# Patient Record
(demographics unavailable — no encounter records)

---

## 2017-01-01 NOTE — PN
Neonatology, Progress Note





-  Exam


Last weight documented: 2.185 kg


Chest Circumference: 29.0


Head Circumference: 29.0


Vital Signs: 


 Vital Signs











Temperature  98.6 F   17 05:30


 


Pulse Rate  160   17 05:30


 


Respiratory Rate  47   17 05:30


 


Blood Pressure  64/32   09/15/17 21:00


 


O2 Sat by Pulse Oximetry (%)  100   09/15/17 21:00











General Appearance: Yes: No Abnormalities, Full ROM, Spontaneous movements


Skin: Yes: No Abnormalities, Jaundice


Head: Yes: No Abnormalities


Eyes: Yes: No Abnormalities, Clear


Ears: Yes: No Abnormalities, Symmetrical


Nose: Yes: No Abnormalities


Mouth: Yes: No Abnormalities


Chest: Yes: No Abnormalities, Symmetrical


Lungs/Respiratory: Yes: Clear, Bilateral good air entry


Cardiac: Yes: No Abnormalities, Murmur (1-2/6 systolic murmur  LSB), Other (S1 

and S2 normal)


Abdomen: Yes: No Abnormalities


Gastrointestinal: Yes: No Abnormalities


Genitalia: No Abnormalities


Genitalia, Male: Yes: Bilateral testes descended, Penis appears normal


Anus: Yes: No Abnormalities, Patent


Extremities: Yes: No Abnormalities, 10 Fingers, 10 Toes


Spine: Yes: No Abnormalities


Neuro: Yes: No Abnormalities, Alert, Active


Cry: No Abnormalities, Strong


Intake and Output: 


 Intake + Output











 09/15/17 09/16/17





 23:59 11:59


 


Intake Total 130 95


 


Output Total 83 51


 


Balance 47 44


 


Intake:  


 


  Oral 25 95


 


  Expressed Breastmilk 40 


 


  Tube Feeding 65 


 


Output:  


 


  Urine 83 51


 


Other:  


 


  Weight 2.185 kg 


 


  Weight Measurement Method Baby Scale 











Labs, Other Data: 


 Baby's Blood Type, Annamaria











Cord Blood Type  A POSITIVE   09/10/17  23:40    


 


RUDOLPH, Poly Interpret  Negative  (NEGATIVE)   09/10/17  23:40    














Assessment/Plan


6 day old  34wk by US and exam (37wk by dates), male infant born via 

primary  for twin gestation and variable decels in twin B. Admitted to 

NICU for prematurity and r/o sepsis ( labor). Mother: s/p betamethasone 

at 28wks - 1 course. Baby was on RA, no respiratory issues, under phototherapy 

started around 12h of life for elevated bili with elevated retics. 





Resp: Continue on RA. Infant remains stable; no ABDs reported; will continue to 

monitor. 


ID: s/p Amp+GentX 48 h-blood culture negative; will continue monitoring. 


Cardio: 1-2/6 systolic murmur LUSB most likely PDA , continue follow.


Hem: Anemia- stable at HCt 29.4- will repeat in 2-3 days( to minimize blood loss

);  bili this morning 9.1- will d/c phototherapy and repeat  Bili in am - G6PD 

sent 17- f/u results.Follow rebound bili   


Metab/ Alim : Feeds at 35 ml Q3h EBM/ Enf 22; off IVF since ; tolerating 

enteral feeds BMP 9/15acceptable. Some nippling issues.

## 2017-01-01 NOTE — PN
Progress Note (short form)





- Note


Progress Note: 


12 hrs old  34wk by US and exam (37wk by dates), male infant born via 

primary  for twin gestation and variable decels in twin B. Admitted to 

NICu for prematurity and r/o sepsis ( labor)





Mother: s/p betamethasone at 28wks - 1 course


Infant remains stable on RA No ABDs reported


Started feeds - tolerating 10ml 


                     Feeds are being advanced and IV being weaned


Infant remains on antibiotics


BMP/Bili from today being sent


Will Rpt CBC/BMP/Bili in AM





Labs


 CBC, BMP





 17 01:10

## 2017-01-01 NOTE — DS
- Maternal History


Mother's Age: 21


 Status: 


Mother's Blood Type: A(+)


HBSAG: Negative


Date: 17


RPR: Negative


Date: 17


Group B Strep: Unknown


GBS Treated in Labor: Yes


HIV: Negative





- Maternal Risks


OB Risks:  Labor, twin gestation





[facility] Data





- Admission


Date of Admission: 17


Admission Time: 00:00


Date of Delivery: 09/10/17


Time of Delivery: 23:40


Wks Gestation by Dates: 37.0


Wks Gestation by Sono: 34.2


Infant Gender: Male


Type of Delivery: Primary C/S


Reason for C Section: Twin gestation , transverse lie


Apgar Score @1 Minute: 9


Apgar score @ 5 Minutes: 9


Birth Weight: 2.25 kg


Birth Length: 43 cm


Head Circumference, Admission: 29.0


Chest Circumference: 29.0


Abdominal Girth: 26.5





- Hearing Screen


Left Ear: Passed


Right Ear: Passed


Hearing Screen Complete: 17





- Labs


Labs: 


 Baby's Blood Type, Annamaria











Cord Blood Type  A POSITIVE   09/10/17  23:40    


 


RUDOLPH, Poly Interpret  Negative  (NEGATIVE)   09/10/17  23:40    














- [facility] Screening


[facility] Screening Card Number: [id number]





Neonatology, Discharge





- History of Present Illness


 History: 


13 day old  34wk by US and exam (37wk by dates), male infant born via 

primary  for twin gestation and variable decels in twin B. Admitted to 

NICU for prematurity and r/o sepsis ( labor). Mother: s/p betamethasone 

at 28wks - 1 course. Baby was on RA, no respiratory issues; with anemia and 

hyperbilirubinemia- treated with phototherapy; off photo for the last 2 days. 

Tolerating feeds po, gaining weight; voiding and stooling





Resp: Stable on room air; no ABDs reported; will continue to monitor. 


ID: s/p Amp+GentX 48 h-blood culture negative; will continue monitoring. 


Cardio: Systolic murmur-diminished compared to my previous exams; 

hemodinamically stable; continue monitoring


Hem: Anemia, worsening (H&H: 6.5/18.5), repeated H&H 6.6/19.5, Retics 8.2; bili 

this morning 12.3/0.5


Spoke with Hematology at [facility] about the worsening anemia; recommendation made to 

transfuse baby after anemia work-up labs sent. Considering the severity of 

anemia, the need for transfusion and further work-up, decision made to transfer 

baby to [facility] for further management.  


Metab/ Alim: Feeds at 35-40 ml Q3h EBM/ Enf 22; tolerating enteral feeds. 


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





-  Infant


Last Weight Documented: 2.235 kg


Head Circumference (cms): 29.0


General Appearance: Yes: No Abnormalities, Full ROM, Pale


Skin: Yes: No Abnormalities, Jaundice


Head: Yes: No Abnormalities


Eyes: Yes: No Abnormalities


Ears: Yes: No Abnormalities


Nose: Yes: No Abnormalities


Mouth: Yes: No Abnormalities


Chest: Yes: No Abnormalities, Symmetrical


Lungs/Respiratory: Yes: No Abnormalities, Clear, Bilateral good air entry


Cardiac: Yes: Murmur (systolic ejection 2/6 LLSB), Peripheral pulses strong, 

Capillary refill immediat


Abdomen: Yes: No Abnormalities


Gastrointestinal: Yes: No Abnormalities


Genitalia: No Abnormalities


Genitalia, Male: Yes: Bilateral testes descended, Penis appears normal


Anus: Yes: No Abnormalities


Extremities: Yes: No Abnormalities, 10 Fingers, 10 Toes


Spine: Yes: No Abnormalities


Reflexes: Lynn: Present, Rooting: Present, Sucking: Present


Neuro: Yes: No Abnormalities


Cry: Yes: No Abnormalities





Discharge Summary


Reason For Visit:  TWIN A


Current Active Problems





Hyperbilirubinemia (Acute) 


Prematurity of fetus (Acute) 


Twin birth, in hospital, delivered by  section (Acute) 


Severe anemia


Horseshoe Kidney


Heart murmur


Condition: Stable





- Instructions


Disposition: TRANSFER ACUTE CARE/OTHER HOSP

## 2017-01-01 NOTE — PN
Neonatology, Progress Note





-  Exam


Last weight documented: 2.155 kg


Chest Circumference: 29.0


Head Circumference: 29.0


Vital Signs: 


 Vital Signs











Temperature  37.1 C   17 11:00


 


Pulse Rate  147   17 11:00


 


Respiratory Rate  62   17 11:00


 


Blood Pressure  67/43   17 08:00


 


O2 Sat by Pulse Oximetry (%)  99   17 09:00











General Appearance: Yes: Full ROM, Spontaneous movements, Pink, Other (slightly 

edema on hands and feet)


Skin: Yes: No Abnormalities


Head: Yes: No Abnormalities


Eyes: Yes: No Abnormalities, Clear


Ears: Yes: No Abnormalities, Symmetrical


Nose: Yes: No Abnormalities, Nares patent


Mouth: Yes: No Abnormalities


Chest: Yes: No Abnormalities, Symmetrical


Cardiac: Yes: No Abnormalities, Murmur (systolic ejection 2/6, LLSB,), Other ((+

S1, S2))


Abdomen: Yes: No Abnormalities, Umb Ves, 2 artery 1 vein


Gastrointestinal: Yes: No Abnormalities


Genitalia: No Abnormalities


Genitalia, Male: Yes: Bilateral testes descended, Penis appears normal


Anus: Yes: No Abnormalities, Patent


Extremities: Yes: No Abnormalities, 10 Fingers, 10 Toes


Spine: Yes: No Abnormalities


Neuro: Yes: No Abnormalities, Alert, Active


Cry: No Abnormalities, Strong


Current Medications: 


Active Medications





Calcium Gluconate 625 mg/ (Dextrose)  500 mls @ 7.5 mls/hr IVPB Q24H ANAHY


   PRN Reason: Protocol








Intake and Output: 


 Intake + Output











 17





 11:59 23:59


 


Intake Total 160.0 


 


Output Total 118 


 


Balance 42.0 


 


Intake:  


 


  IV 90.0 


 


    D7.5 W 90.0 


 


  Oral 10 


 


  Tube Feeding 60 


 


Output:  


 


  Urine 118 


 


Other:  


 


  Bowel Movement Yes 


 


  Weight 2.155 kg 


 


  Weight Measurement Method Baby Scale 











Labs, Other Data: 


 Baby's Blood Type, Annamaria











Cord Blood Type  A POSITIVE   09/10/17  23:40    


 


RUDOLPH, Poly Interpret  Negative  (NEGATIVE)   09/10/17  23:40    








 Laboratory Tests











  17





  07:35 07:35


 


WBC  12.5 


 


Corrected WBC (auto)  11.26 


 


RBC  3.57 L 


 


Hgb  9.9 L 


 


Hct  29.4 L* 


 


MCV  82.3 L 


 


MCH  27.8 L 


 


MCHC  33.8 


 


RDW  52.8 H 


 


Plt Count  454 H 


 


MPV  8.2 


 


Total Counted  100 


 


Neutrophils % (Manual)  51 


 


Lymphocytes % (Manual)  33 


 


Monocytes % (Manual)  15 H 


 


Basophils % (Manual)  1 


 


Nucleated RBC %  11 H 


 


Sodium   146 H


 


Potassium   4.9


 


Chloride   112 H


 


Carbon Dioxide   23


 


Anion Gap   11


 


BUN   4 L D


 


Creatinine   0.6 L D


 


Calcium   8.5


 


Total Bilirubin   9.2


 


Direct Bilirubin   0.4 H D














Problem List





- Problems


(1) Prematurity of fetus


Code(s): P07.30 -  , UNSPECIFIED WEEKS OF GESTATION





(2) Twin birth, in hospital, delivered by  section


Code(s): Z38.31 - TWIN LIVEBORN INFANT, DELIVERED BY 








Assessment/Plan


3 day old  34wk by US and exam (37wk by dates), male infant born via 

primary  for twin gestation and variable decels in twin B. Admitted to 

NICU for prematurity and r/o sepsis ( labor). Mother: s/p betamethasone 

at 28wks - 1 course. Baby was on RA, no respiratory issues, under phototherapy 

started around 12h of life for elevated bili with elevated retics. 





Resp: Continue on RA. Infant remains stable; no ABDs reported; will continue to 

monitor. 


ID: Discontinue Amp+Gent; 48 h blood culture negative; WBC acceptable; will 

continue monitoring. 


Cardio: Systolic murmur, most likely closing PDA ; hemodinamically stable; 

continue monitoring- if persists, consider Echo


Hem: Anemia with elevated retics and hyperbili; will continue phototherapy and 

monitoring CBCd, Bili and Retics- stable so far; G6PD sent 17.  


Metab/ Alim :  IVF at 80 ml/kg/day; tolerating 20 ml Q3h OGT. bili stable, plan 

to wean IVF as feeds are being advanced. Plan for bili in am


Labs: no CBC in am as it has been stable and with mlow HCT want to minimize 

blood draws, no BMP as it has been stable and infant is tolerating advancign 

feeds and weaning IVF. Consider CBC/retic/BMP in a few days. 





Discussed plan with nurses; family updated.

## 2017-01-01 NOTE — PN
Neonatology, Progress Note





- History of Present Illness


 History: 


9 day old male with hemolytic anemia (w[place] negative), hyperbilirubinemia, 

learning to nipple (feeding issues), prematurity, twin A. TOlerating feeds 

well. Voiding and stooling. 





-  Exam


Last weight documented: 2.14 kg


Chest Circumference: 29.0


Head Circumference: 29.0


Vital Signs: 


 Vital Signs











Temperature  37.0 C   17 05:30


 


Pulse Rate  169 H  17 05:30


 


Respiratory Rate  59   17 05:30


 


Blood Pressure  64/35   17 21:00


 


O2 Sat by Pulse Oximetry (%)  99   17 21:00











General Appearance: Yes: No Abnormalities, Full ROM, Spontaneous movements


Skin: Yes: No Abnormalities, Jaundice


Head: Yes: No Abnormalities


Eyes: Yes: No Abnormalities, Clear


Ears: Yes: No Abnormalities, Symmetrical


Nose: Yes: No Abnormalities


Mouth: Yes: No Abnormalities


Chest: Yes: No Abnormalities, Symmetrical


Lungs/Respiratory: Yes: No Abnormalities, Clear, Bilateral good air entry


Cardiac: Yes: No Abnormalities, Murmur (1-2/6 systolic murmur  LSB), Other (S1 

and S2 normal)


Abdomen: Yes: No Abnormalities


Gastrointestinal: Yes: No Abnormalities


Genitalia: No Abnormalities


Genitalia, Male: Yes: Bilateral testes descended, Penis appears normal, 

Hydrocele, Other


Anus: Yes: No Abnormalities, Patent


Extremities: Yes: No Abnormalities, 10 Fingers, 10 Toes


Spine: Yes: No Abnormalities


Reflexes: Lopez: Present, Sucking: Present


Neuro: Yes: No Abnormalities, Alert, Active


Cry: No Abnormalities, Strong


Intake and Output: 


 Intake + Output











 17





 23:59 11:59


 


Intake Total 141 120


 


Output Total 69 60


 


Balance 72 60


 


Intake:  


 


  Oral 141 75


 


  Expressed Breastmilk  45


 


Output:  


 


  Urine 69 60


 


Other:  


 


  Weight 2.14 kg 


 


  Weight Measurement Method Baby Scale 











Labs, Other Data: 


 Baby's Blood Type, [place]











Cord Blood Type  A POSITIVE   09/10/17  23:40    


 


RUDOLPH, Poly Interpret  Negative  (NEGATIVE)   09/10/17  23:40    








 Laboratory Tests











  17





  08:00 08:00 08:00


 


WBC  6.3 L D  


 


RBC  2.98 L  


 


Hgb  7.6 L  


 


Hct  22.7 L* D  


 


MCV  76.2 L  


 


MCH  25.5 L  


 


MCHC  33.5  


 


RDW  47.2 H  


 


Plt Count  666 H D  


 


MPV  8.3  


 


Total Counted  100  


 


Neutrophils % (Manual)  22 L D  


 


Monocytes % (Manual)  13 H  


 


Eosinophils % (Manual)  4  D  


 


Basophils % (Manual)  2  


 


Anisocytosis  4+  


 


Microcytosis  2+  


 


Macrocytosis  2+  


 


Schistocytes  3+  


 


Retic Count    5.25 H D


 


Sodium   142 


 


Potassium   5.0 


 


Chloride   111 H 


 


Carbon Dioxide   26 


 


BUN   12  D 


 


Creatinine   0.5 L 


 


Calcium   9.4 


 


Total Bilirubin   10.6 


 


Direct Bilirubin   0.4 H 


 


AST   29  D 


 


ALT   13  D 


 


Alkaline Phosphatase   288 H 


 


Total Protein   5.0 L 


 


Albumin   2.9 L 














Problem List





- Problems


(1) Prematurity of fetus


Code(s): P07.30 -  , UNSPECIFIED WEEKS OF GESTATION





(2) Twin birth, in hospital, delivered by  section


Code(s): Z38.31 - TWIN LIVEBORN INFANT, DELIVERED BY 








Assessment/Plan


9 day old  34wk by US and exam (37wk by dates), male infant born via 

primary  for twin gestation and variable decels in twin B. Admitted to 

NICU for prematurity and r/o sepsis ( labor). Mother: s/p betamethasone 

at 28wks - 1 course. Baby was on RA, no respiratory issues, under phototherapy 

restarted  





Resp: Stable on room air; no ABDs reported; will continue to monitor. 


ID: s/p Amp+GentX 48 h-blood culture negative; will continue monitoring. 


Cardio: Systolic murmur; hemodinamically stable; continue monitoring- if 

persists, will consider Echo, will get EKG today


Hem: Anemia-last Hct 22- this am. Not symptomatic, retic decreased, will repeat 

in am;  total bili this morning 10.6- will continue phototherapy- one bank only 

and repeat  Bili in am . G6PD sent 17-test not performed due to clotted 

sample- resulted . Will hold off on re-sedning at this time given HCT. 

After repeat HCT if improvement will consider resending G6PD.  TST, Free T4 -

WNL. NBS (lab ID [id number]) not ready yet as per [facility]


Metab/ Alim: Feeds at 35 ml Q3h EBM/ Enf 22; tolerating enteral feeds, nippling 

Q other feed. Encourage po feeds. CMP acceptable  





Discussed plan with nurses; will update family.

## 2017-01-01 NOTE — PN
Neonatology, Progress Note





- History of Present Illness


 History: 


5 day old male, twin A, ex 34 weeker, with anemia and hyperbili, on 

phototherapy this morning. Voiding and stooling well. On feeds po/NGT; IVF 

stopped overnight; doing well; tolerating feeds, no acute events overnight. 





-  Exam


Last weight documented: 2.185 kg


Chest Circumference: 29.0


Head Circumference: 29.0


Vital Signs: 


 Vital Signs











Temperature  37.2 C   09/15/17 05:30


 


Pulse Rate  154   09/15/17 05:30


 


Respiratory Rate  38   09/15/17 05:30


 


Blood Pressure  72/50   17 20:00


 


O2 Sat by Pulse Oximetry (%)  99   17 20:00











General Appearance: Yes: Full ROM, Spontaneous movements, Pink, Other


Skin: Yes: No Abnormalities


Head: Yes: No Abnormalities


Eyes: Yes: No Abnormalities, Clear


Ears: Yes: No Abnormalities, Symmetrical


Nose: Yes: No Abnormalities, Nares patent


Mouth: Yes: No Abnormalities


Chest: Yes: No Abnormalities, Symmetrical


Cardiac: Yes: No Abnormalities, Murmur (systolic ejection 2/6, LLSB, softer 

than yesterday), Other ((+S1, S2))


Abdomen: Yes: No Abnormalities, Umb Ves, 2 artery 1 vein


Gastrointestinal: Yes: No Abnormalities


Genitalia: No Abnormalities


Genitalia, Male: Yes: Bilateral testes descended, Penis appears normal


Anus: Yes: No Abnormalities, Patent


Extremities: Yes: No Abnormalities, 10 Fingers, 10 Toes


Spine: Yes: No Abnormalities


Neuro: Yes: No Abnormalities, Alert, Active


Cry: No Abnormalities, Strong


Intake and Output: 


 Intake + Output











 09/14/17 09/15/17





 23:59 11:59


 


Intake Total 199.2 46


 


Output Total 102 72


 


Balance 97.2 -26


 


Intake:  


 


  IV 34.2 1


 


    D5W + CALCIUM 34.2 1


 


  Oral 62 10


 


  Tube Feeding 103 35


 


Output:  


 


  Urine 102 71


 


  Oral Regurgitation  1


 


Other:  


 


  Bowel Movement Yes 


 


  Weight 2.2 kg 2.185 kg


 


  Weight Measurement Method  Baby Scale











Labs, Other Data: 


 Baby's Blood Type, Annamaria











Cord Blood Type  A POSITIVE   09/10/17  23:40    


 


RUDOLPH, Poly Interpret  Negative  (NEGATIVE)   09/10/17  23:40    














Problem List





- Problems


(1) Hyperbilirubinemia


Code(s): E80.6 - OTHER DISORDERS OF BILIRUBIN METABOLISM





(2) Prematurity of fetus


Code(s): P07.30 -  , UNSPECIFIED WEEKS OF GESTATION





(3) Twin birth, in hospital, delivered by  section


Code(s): Z38.31 - TWIN LIVEBORN INFANT, DELIVERED BY 








Assessment/Plan


5 day old  34wk by US and exam (37wk by dates), male infant born via 

primary  for twin gestation and variable decels in twin B. Admitted to 

NICU for prematurity and r/o sepsis ( labor). Mother: s/p betamethasone 

at 28wks - 1 course. Baby was on RA, no respiratory issues, under phototherapy 

started around 12h of life for elevated bili with elevated retics. 





Resp: Continue on RA. Infant remains stable; no ABDs reported; will continue to 

monitor. 


ID: s/p Amp+GentX 48 h-blood culture negative; will continue monitoring. 


Cardio: Systolic murmur: decreased in intensity- closing PDA vs anemia; 

hemodinamically stable; continue monitoring- if persists, will consider Echo


Hem: Anemia- stable at HCt 29.4- will repeat in 2-3 days( to minimize blood loss

);  bili this morning 9.1- will d/c phototherapy and repeat  Bili in am - G6PD 

sent 17- f/u results.   


Metab/ Alim : Feeds at 35 ml Q3h EBM/ Enf 22; off IVF since overnight; 

tolerating enteral feeds, but getting tired with nippling; will nipple Q other 

feed; increase volume to 40 ml Q3h; BMP today -acceptable. 





Discussed plan with nurses; family updated.

## 2017-01-01 NOTE — PN
Neonatology, Progress Note





- History of Present Illness


 History: 


4 day old male, twin A, ex 34 weeker, with anemia and hyperbili, on 

phototherapy this morning. Voiding and stooling well. On feeds+IVF





-  Exam


Last weight documented: 2.2 kg


Chest Circumference: 29.0


Head Circumference: 29.0


Vital Signs: 


 Vital Signs











Temperature  37.3 C   17 08:30


 


Pulse Rate  142   17 08:30


 


Respiratory Rate  54   17 08:30


 


Blood Pressure  61/44   17 08:30


 


O2 Sat by Pulse Oximetry (%)  100   17 08:00











General Appearance: Yes: Full ROM, Spontaneous movements, Pink, Other (slightly 

edema on hands and feet)


Skin: Yes: No Abnormalities


Head: Yes: No Abnormalities


Eyes: Yes: No Abnormalities, Clear


Ears: Yes: No Abnormalities, Symmetrical


Nose: Yes: No Abnormalities, Nares patent


Mouth: Yes: No Abnormalities


Chest: Yes: No Abnormalities, Symmetrical


Cardiac: Yes: No Abnormalities, Murmur (systolic ejection 2/6, LLSB,), Other ((+

S1, S2))


Abdomen: Yes: No Abnormalities, Umb Ves, 2 artery 1 vein


Gastrointestinal: Yes: No Abnormalities


Genitalia: No Abnormalities


Genitalia, Male: Yes: Bilateral testes descended, Penis appears normal


Anus: Yes: No Abnormalities, Patent


Extremities: Yes: No Abnormalities, 10 Fingers, 10 Toes


Spine: Yes: No Abnormalities


Neuro: Yes: No Abnormalities, Alert, Active


Cry: No Abnormalities, Strong


Current Medications: 


Active Medications





Calcium Gluconate 625 mg/ (Dextrose)  500 mls @ 7.5 mls/hr IVPB Q24H ANAHY


   PRN Reason: Protocol


   Last Admin: 17 13:00 Dose:  7.5 mls/hr








Intake and Output: 


 Intake + Output











 17





 23:59 11:59


 


Intake Total 126.0 40.3


 


Output Total 115 98


 


Balance 11.0 -57.7


 


Intake:  


 


  IV 81.0 40.3


 


    D7.5 W 30.0 


 


    D5W 19.1 


 


    D5W + CALCIUM 31.9 40.3


 


  Tube Feeding 45 


 


Output:  


 


  Urine 115 98


 


Other:  


 


  Weight 2.155 kg 2.2 kg


 


  Weight Measurement Method  Baby Scale











Labs, Other Data: 


 Baby's Blood Type, Annamaria











Cord Blood Type  A POSITIVE   09/10/17  23:40    


 


RUDOLPH, Poly Interpret  Negative  (NEGATIVE)   09/10/17  23:40    














Problem List





- Problems


(1) Hyperbilirubinemia


Code(s): E80.6 - OTHER DISORDERS OF BILIRUBIN METABOLISM





(2) Prematurity of fetus


Code(s): P07.30 -  , UNSPECIFIED WEEKS OF GESTATION





(3) Twin birth, in hospital, delivered by  section


Code(s): Z38.31 - TWIN LIVEBORN INFANT, DELIVERED BY 








Assessment/Plan


4 day old  34wk by US and exam (37wk by dates), male infant born via 

primary  for twin gestation and variable decels in twin B. Admitted to 

NICU for prematurity and r/o sepsis ( labor). Mother: s/p betamethasone 

at 28wks - 1 course. Baby was on RA, no respiratory issues, under phototherapy 

started around 12h of life for elevated bili with elevated retics. 





Resp: Continue on RA. Infant remains stable; no ABDs reported; will continue to 

monitor. 


ID: s/p Amp+GentX 48 h-blood culture negative; WBC acceptable; will continue 

monitoring. 


Cardio: Systolic murmur: decreased in intensity- closing PDA vs anemia; 

hemodinamically stable; continue monitoring- if persists, will consider Echo


Hem: Anemia- stable at HCt 29.4- will repeat in 2-3 days( to minimise blood loss

);  bili this morning 10- will continue phototherapy  and repeat  Bili in am - 

G6PD sent 17- f/u results.   


Metab/ Alim : Feeds at 30 ml Q3h; this morning, had large residual and 

abdominal distension; one feed on hold; belly is soft, non-tender, good bowel 

sounds. will restart feeds at 2o ml Q3h and will gradually increase as tolerated

;  IVF at 40 ml/kg/day- plan to wean IVF as feeds if baby is tolerating feeds. 

No BMP today to minimize blood draws- will do BMP in am. 





Discussed plan with nurses; family updated.

## 2017-01-01 NOTE — PN
Neonatology, Progress Note





- History of Present Illness


 History: 


10 day old male twin A with anemia, hyperbilirubinemia, on phototherapy. 

Tolerating feeds. Voiding and stooling





-  Exam


Last weight documented: 2.17 kg


Chest Circumference: 29.0


Head Circumference: 29.0


Vital Signs: 


 Vital Signs











Temperature  37.1 C   17 12:00


 


Pulse Rate  167 H  17 12:00


 


Respiratory Rate  64   17 12:00


 


Blood Pressure  63/36   17 09:00


 


O2 Sat by Pulse Oximetry (%)  100   17 09:00











General Appearance: Yes: No Abnormalities, Full ROM, Spontaneous movements


Skin: Yes: No Abnormalities, Jaundice


Head: Yes: No Abnormalities


Eyes: Yes: No Abnormalities, Clear


Ears: Yes: No Abnormalities, Symmetrical


Nose: Yes: No Abnormalities


Mouth: Yes: No Abnormalities


Chest: Yes: No Abnormalities, Symmetrical


Lungs/Respiratory: Yes: No Abnormalities, Clear, Bilateral good air entry


Cardiac: Yes: No Abnormalities, Murmur (1-2/6 systolic murmur  LSB), Other (S1 

and S2 normal)


Abdomen: Yes: No Abnormalities


Gastrointestinal: Yes: No Abnormalities


Genitalia: No Abnormalities


Genitalia, Male: Yes: Bilateral testes descended, Penis appears normal, 

Hydrocele, Other


Anus: Yes: No Abnormalities, Patent


Extremities: Yes: No Abnormalities, 10 Fingers, 10 Toes


Spine: Yes: No Abnormalities


Reflexes: Lopez: Present, Sucking: Present


Neuro: Yes: No Abnormalities, Alert, Active


Cry: No Abnormalities, Strong


Intake and Output: 


 Intake + Output











 17





 11:59 23:59


 


Intake Total 160 40


 


Output Total 92 22


 


Balance 68 18


 


Intake:  


 


  Oral 115 40


 


  Expressed Breastmilk 45 


 


Output:  


 


  Urine 92 22


 


Other:  


 


  Weight 2.17 kg 


 


  Weight Measurement Method Baby Scale 











Labs, Other Data: 


 Baby's Blood Type, Annamaria











Cord Blood Type  A POSITIVE   09/10/17  23:40    


 


RUDOLPH, Poly Interpret  Negative  (NEGATIVE)   09/10/17  23:40    








 Laboratory Tests











  17





  07:35 07:35


 


WBC  7.2 L 


 


RBC  4.34  D 


 


Hgb  10.8 L D 


 


Hct  32.8 L* D 


 


MCV  75.7 L 


 


MCH  25.0 L 


 


MCHC  33.0 


 


RDW  45.6 H 


 


Plt Count  435 H D 


 


Neutrophils %  27.5 L D 


 


Lymphocytes %  49.9 H D 


 


Total Bilirubin   10.2


 


Direct Bilirubin   0.4 H














Problem List





- Problems


(1) Prematurity of fetus


Code(s): P07.30 -  , UNSPECIFIED WEEKS OF GESTATION





(2) Twin birth, in hospital, delivered by  section


Code(s): Z38.31 - TWIN LIVEBORN INFANT, DELIVERED BY 








Assessment/Plan


9 day old  34wk by US and exam (37wk by dates), male infant born via 

primary  for twin gestation and variable decels in twin B. Admitted to 

NICU for prematurity and r/o sepsis ( labor). Mother: s/p betamethasone 

at 28wks - 1 course. Baby was on RA, no respiratory issues, under phototherapy 

restarted  





Resp: Stable on room air; no ABDs reported; will continue to monitor. 


ID: s/p Amp+GentX 48 h-blood culture negative; will continue monitoring. 


Cardio: Systolic murmur; hemodinamically stable; continue monitoring- if 

persists, will consider Echo, EKG  normal for age


Hem: Anemia-last Hct 32- this am. Not symptomatic, retic decreased;  total bili 

this morning 10.2- will continue phototherapy- one bank only and repeat  Bili 

in am . G6PD sent 17-test not performed due to clotted sample- resulted . consider resending G6PD- need to coordiate withoutside lab for timing of 

collection and sample to be run.  TST, Free T4 -WNL. NBS (lab ID [id number]) not 

ready yet as per [facility]


Metab/ Alim: Feeds at 35 ml Q3h EBM/ Enf 22; tolerating enteral feeds, nippling 

Q other feed. Encourage po feeds. CMP acceptable  





Discussed plan with nurses; will update family.

## 2017-01-01 NOTE — HP
- Maternal History


Mother's Age: 21


 Status: 


Mother's Blood Type: A(+)


HBSAG: Negative


Date: 17


RPR: Negative


Date: 17


Group B Strep: Unknown


HIV: Negative


Other: PPD unknown, Rubella Immune,





Level 2, History and Physical


[place] History: 


 34wks by US and exam (37wks by dates) male infant twin A (di-di twin 

gestation). Pregnancy complicated by  labor at 28wks with one course of 

betamethasone given. Mother presented tonight in labor and dilated 3cm with 

variable decels in twin B. 





Born via  for twin gestation and variable decels in baby B. Infant 

born vigorous, cried immediately. Brought to warmer and routine Dr care given. 

Voided in DR. APGARs 9/9 at 1/5 minutes. Admitted to NICU for prematurity, r/o 

sepsis ( labor).





Initial glucose 56. 





Cord blood gas 7.3/51.4/22.7/24.6/-1.5





- [place] Infant


Birth Weight: 2.25 kg


Birth Length: 43.18 cm


General Appearance: Yes: Full ROM, Spontaneous movements, Pink


Skin: Yes: No Abnormalities, Other


Head: Yes: No Abnormalities


Eyes: Yes: No Abnormalities, Clear, Red reflex present


Ears: Yes: No Abnormalities, Symmetrical


Nose: Yes: No Abnormalities, Nares patent


Mouth: Yes: No Abnormalities


Chest: Yes: No Abnormalities, Symmetrical


Lungs/Respiratory: Yes: No Abnormalities, Clear, Bilateral good air entry


Cardiac: Yes: No Abnormalities, Other ((+0S1S2 (+) murmur likely closing PDA)


Abdomen: Yes: No Abnormalities, Umb Ves, 2 artery 1 vein


Gastrointestinal: Yes: No Abnormalities


Genitalia: No Abnormalities


Genitalia, Male: Yes: Bilateral testes descended, Penis appears normal


Anus: Yes: No Abnormalities, Patent


Extremities: Yes: No Abnormalities, 10 Fingers, 10 Toes


Spine: Yes: No Abnormalities


Neuro: Yes: No Abnormalities, Alert, Active


Cry: Yes: No Abnormalities, Strong





Problem List





- Problems


(1) Prematurity of fetus


Code(s): P07.30 -  , UNSPECIFIED WEEKS OF GESTATION





(2) Twin birth, in hospital, delivered by  section


Code(s): Z38.31 - TWIN LIVEBORN INFANT, DELIVERED BY 








Assessment/Plan


 34wk by US and exam (37wk by dates), male infant born via primary c-

section for twin gestation and variable decels in twin B. Admitted to NICu for 

prematurity and r/o sepsis ( labor)





Mother: s/p betamethasone at 28wks - 1 course





Plan:


admit to NICU


continuous cardiovascular monitoring


CBC and blood culture now


Amp/Gent


PIV with D10 and calcium at 100ml/kg/day


attempt to feed 5-10ml and advance as tolerated


repeat CBC, BMP and bili at 12hrs of life

## 2017-01-01 NOTE — PN
Neonatology, Progress Note





- History of Present Illness


 History: 


12 days old with - Bili improving, phototherapy discontinued . Tolerating 

PO feeds. Voiding and stooling. 








-  Exam


Last weight documented: 2.17 kg


Chest Circumference: 29.0


Head Circumference: 29.0


Vital Signs: 


 Vital Signs











Temperature  98.8 F   17 11:00


 


Pulse Rate  150   17 11:00


 


Respiratory Rate  34   17 11:00


 


Blood Pressure  65/29   17 09:00


 


O2 Sat by Pulse Oximetry (%)  100   17 09:00











General Appearance: Yes: No Abnormalities, Full ROM, Spontaneous movements


Skin: Yes: No Abnormalities, Jaundice


Head: Yes: No Abnormalities


Eyes: Yes: No Abnormalities, Clear


Ears: Yes: No Abnormalities, Symmetrical


Nose: Yes: No Abnormalities


Mouth: Yes: No Abnormalities


Chest: Yes: No Abnormalities, Symmetrical


Lungs/Respiratory: Yes: No Abnormalities, Clear, Bilateral good air entry


Cardiac: Yes: No Abnormalities, Murmur (1-2/6 systolic murmur  LSB), Other (S1 

and S2 normal)


Abdomen: Yes: No Abnormalities


Gastrointestinal: Yes: No Abnormalities


Genitalia: No Abnormalities


Genitalia, Male: Yes: Bilateral testes descended, Penis appears normal, 

Hydrocele, Other


Anus: Yes: No Abnormalities, Patent


Extremities: Yes: No Abnormalities, 10 Fingers, 10 Toes


Spine: Yes: No Abnormalities


Reflexes: Lopez: Present, Sucking: Present


Neuro: Yes: No Abnormalities, Alert, Active


Cry: No Abnormalities, Strong


Intake and Output: 


 Intake + Output











 17





 11:59 23:59


 


Intake Total 260 


 


Output Total 69 


 


Balance 191 


 


Intake:  


 


  Oral 260 


 


Output:  


 


  Urine 69 











Labs, Other Data: 


 Baby's Blood Type, Annamaria











Cord Blood Type  A POSITIVE   09/10/17  23:40    


 


RUDOLPH, Poly Interpret  Negative  (NEGATIVE)   09/10/17  23:40    














Assessment/Plan


10 day old  34wk by US and exam (37wk by dates), male infant born via 

primary  for twin gestation and variable decels in twin B. Admitted to 

NICU for prematurity and r/o sepsis ( labor). Mother: s/p betamethasone 

at 28wks - 1 course. Baby was on RA, no respiratory issues, under phototherapy 

restarted  





Resp: Stable on room air; no ABDs reported; will continue to monitor. 


ID: s/p Amp+GentX 48 h-blood culture negative; will continue monitoring. 


Cardio: Systolic murmur; hemodynamically stable; continue monitoring- if 

persists, will consider Echo, EKG  normal for age


Hem: Anemia-last Hct 32- this am. Not symptomatic, retic decreased;  total bili 

this morning 9- will discontinue phototherapy- and repeat  Bili in am . G6PD 

sent 17-test not performed due to clotted sample- resulted . consider 

resending G6PD- need to coordiate withoutside lab for timing of collection and 

sample to be run.  TSH, Free T4 -WNL. NBS (lab ID [id number]) not ready yet as 

per [facility]


Metab/ Alim: Feeds at 35 ml Q3h EBM/ Enf 22; tolerating enteral feeds, nippling 

Q other feed. Encourage po feeds


Rpt G6PD when retic count is low - at 4-6 weeks of age.





Bili:Total Bilirubin    9.0 mg/dL mg/dL    4.6 mg/dL L D mg/dL


     (6-12)     (6-12) 


Direct Bilirubin    0.4 mg/dL H mg/dL    0.2 mg/dL  D mg/dL


     (0.0-0.2)     (0.0-0.2)

## 2017-01-01 NOTE — PN
Neonatology, Progress Note





- History of Present Illness


 History: 


2 days old,  34wks by US and exam (37wks by dates) male infant twin A (di

-di twin gestation). Pregnancy complicated by  labor at 28wks with one 

course of betamethasone given. Born via  for twin gestation and 

variable decels in baby B. Infant born vigorous, cried immediately. Brought to 

warmer and routine Dr care given. Voided in DR. APGARs 9/9 at 1/5 minutes. 

Admitted to NICU for prematurity, r/o sepsis ( labor). Was on RA, stable

; was started yesterday on triple photo  for hyperbili. This morning bili 11/

0.4. Taking 15 ml Q3h NGT. Voiding and stooling. 





-  Exam


Last weight documented: 2.3 kg


Chest Circumference: 29.0


Head Circumference: 29.0


Vital Signs: 


 Vital Signs











Temperature  36.8 C   17 04:00


 


Pulse Rate  152   17 04:00


 


Respiratory Rate  49   17 04:00


 


Blood Pressure  64/2   17 19:00


 


O2 Sat by Pulse Oximetry (%)  99   17 19:00











General Appearance: Yes: Full ROM, Spontaneous movements, Pink, Other (slightly 

edema on hands and feet)


Skin: Yes: No Abnormalities


Head: Yes: No Abnormalities


Eyes: Yes: No Abnormalities, Clear


Ears: Yes: No Abnormalities, Symmetrical


Nose: Yes: No Abnormalities, Nares patent


Mouth: Yes: No Abnormalities


Chest: Yes: No Abnormalities, Symmetrical


Cardiac: Yes: No Abnormalities, Murmur (systolic ejection 2/6, LLSB,), Other ((+

S1, S2))


Abdomen: Yes: No Abnormalities, Umb Ves, 2 artery 1 vein


Gastrointestinal: Yes: No Abnormalities


Genitalia: No Abnormalities


Genitalia, Male: Yes: Bilateral testes descended, Penis appears normal


Anus: Yes: No Abnormalities, Patent


Extremities: Yes: No Abnormalities, 10 Fingers, 10 Toes


Spine: Yes: No Abnormalities


Neuro: Yes: No Abnormalities, Alert, Active


Cry: No Abnormalities, Strong


Current Medications: 


Active Medications





Ampicillin Sodium (Ampicillin -)  113 mg IVPB Q12H [facility]


   Last Admin: 17 02:45 Dose:  113 mg


Gentamicin Sulfate (Garamycin *Pediatric Injection* -)  10 mg IVPB Q36H [facility]


   Last Admin: 17 03:15 Dose:  10 mg


Dextrose 13.9 gm/ Dextrose  500 mls @ 9.4 mls/hr IVPB DAILY [facility]


   Last Admin: 17 23:00 Dose:  9.4 mls/hr








Intake and Output: 


 Intake + Output











 17





 23:59 11:59


 


Intake Total 157.7 86.4


 


Output Total 135 78


 


Balance 22.7 8.4


 


Intake:  


 


  IV 97.7 56.4


 


    D10W 50.7 28.2


 


  Oral 10 10


 


  Tube Feeding 50 20


 


Output:  


 


  Urine 135 78


 


Other:  


 


  Weight  2.3 kg


 


  Weight Measurement Method  Baby Scale











Labs, Other Data: 


 Baby's Blood Type, Annamaria











Cord Blood Type  A POSITIVE   09/10/17  23:40    


 


RUDOLPH, Poly Interpret  Negative  (NEGATIVE)   09/10/17  23:40    











Other Findings/Remarks: 


 Baby's Blood Type, Annamaria











Cord Blood Type  A POSITIVE   09/10/17  23:40    


 


RUDOLPH, Poly Interpret  Negative  (NEGATIVE)   09/10/17  23:40    














Problem List





- Problems


(1) Hyperbilirubinemia


Code(s): E80.6 - OTHER DISORDERS OF BILIRUBIN METABOLISM





(2) Prematurity of fetus


Code(s): P07.30 -  , UNSPECIFIED WEEKS OF GESTATION





(3) Twin birth, in hospital, delivered by  section


Code(s): Z38.31 - TWIN LIVEBORN INFANT, DELIVERED BY 








Assessment/Plan


2 days old  34wk by US and exam (37wk by dates), male infant born via 

primary  for twin gestation and variable decels in twin B. Admitted to 

NICU for prematurity and r/o sepsis ( labor). Mother: s/p betamethasone 

at 28wks - 1 course. Baby was on RA, no respiratory issues, under phototherapy 

started around 12h of life for elevated bili with elevated retics. 





Resp: Continue on RA. Infant remains stable; no ABDs reported; will continue to 

monitor. 


ID: Continue Amp+Gent; 24 h blood culture negative; WBC WNL; will continue 

monitoring; f/u 48h blood culture results. 


Cardio: Systolic murmur, most likely closing PDA ; hemodinamically stable; 

continue monitoring- if persists, consider Echo


Hem: Anemia with elevated retics and hyperbili; will continue phototherapy and 

monitoring CBCd, Bili and Retics- stable so far; also will send G6PD.  


Metab/ Alim : wean IVF to 80 ml/kg/day; tolerating 15 ml Q3h OGT. IF tolerating 

enteral feeds and bili stable, consider slowly weaning IVF as feeds are being 

advanced. Repeat BMP and bili in am. 





Discussed plan with nurses; family updated.

## 2017-01-01 NOTE — PN
Neonatology, Progress Note





- History of Present Illness


 History: 


7 day old male, twin A, ex 34 weeker, with anemia and hyperbili, on phototherapy

, restarted yesterday.  Voiding and stooling well.  Tolerating feeds, no acute 

events overnight. 





-  Exam


Last weight documented: 2.145 kg


Chest Circumference: 29.0


Head Circumference: 29.0


Vital Signs: 


 Vital Signs











Temperature  36.9 C   17 09:00


 


Pulse Rate  161 H  17 09:00


 


Respiratory Rate  60   17 09:00


 


Blood Pressure  61/33   17 09:00


 


O2 Sat by Pulse Oximetry (%)  100   17 09:00











General Appearance: Yes: No Abnormalities, Full ROM, Spontaneous movements


Skin: Yes: No Abnormalities, Jaundice


Head: Yes: No Abnormalities


Eyes: Yes: No Abnormalities, Clear


Ears: Yes: No Abnormalities, Symmetrical


Nose: Yes: No Abnormalities


Mouth: Yes: No Abnormalities


Chest: Yes: No Abnormalities, Symmetrical


Cardiac: Yes: No Abnormalities, Murmur (2/6 systolic murmur  LSB), Other (S1 

and S2 normal)


Abdomen: Yes: No Abnormalities


Gastrointestinal: Yes: No Abnormalities


Genitalia: No Abnormalities


Genitalia, Male: Yes: Bilateral testes descended, Penis appears normal, 

Hydrocele, Other (enlarged right side of scrotum; soft, no tenderness on 

palpation,no change in color; transilluminates)


Anus: Yes: No Abnormalities, Patent


Extremities: Yes: No Abnormalities, 10 Fingers, 10 Toes


Spine: Yes: No Abnormalities


Reflexes: Sucking: Present


Neuro: Yes: No Abnormalities, Alert, Active


Cry: No Abnormalities, Strong


Intake and Output: 


 Intake + Output











 17





 23:59 11:59


 


Intake Total 140 105


 


Output Total 58 62


 


Balance 82 43


 


Intake:  


 


  Oral 105 65


 


  Expressed Breastmilk 35 40


 


Output:  


 


  Urine 58 62


 


Other:  


 


  Weight 2.145 kg 


 


  Weight Measurement Method Baby Scale 











Labs, Other Data: 


 Baby's Blood Type, Annamaria











Cord Blood Type  A POSITIVE   09/10/17  23:40    


 


RUDOLPH, Poly Interpret  Negative  (NEGATIVE)   09/10/17  23:40    








 CBC, BMP





 17 07:35 





 09/15/17 08:15 











Problem List





- Problems


(1) Hyperbilirubinemia


Code(s): E80.6 - OTHER DISORDERS OF BILIRUBIN METABOLISM





(2) Prematurity of fetus


Code(s): P07.30 -  , UNSPECIFIED WEEKS OF GESTATION





(3) Twin birth, in hospital, delivered by  section


Code(s): Z38.31 - TWIN LIVEBORN INFANT, DELIVERED BY 








Assessment/Plan


7 day old  34wk by US and exam (37wk by dates), male infant born via 

primary  for twin gestation and variable decels in twin B. Admitted to 

NICU for prematurity and r/o sepsis ( labor). Mother: s/p betamethasone 

at 28wks - 1 course. Baby was on RA, no respiratory issues, under restarted 

yesterday 





Resp: Continue on RA. Infant remains stable; no ABDs reported; will continue to 

monitor. 


ID: s/p Amp+GentX 48 h-blood culture negative; will continue monitoring. 


Cardio: Systolic murmur; hemodinamically stable; continue monitoring- if 

persists, will consider Echo


Hem: Anemia- last HCt 29.4- will repeat CBC in AM;  bili this morning 11.3/0.4- 

will continue phototherapy and repeat  Bili in am - G6PD sent 17- f/u 

results.  TST, Free T4 -WNL. 


Metab/ Alim: Feeds at 35 ml Q3h EBM/ Enf 22; tolerating enteral feeds, nippling 

Q other feed. Encourage po feeds. 





Discussed plan with nurses; family updated.

## 2017-01-01 NOTE — PN
Neonatology, Progress Note





- History of Present Illness


 History: 


8 day old male, twin A, ex 34 weeker, with anemia and hyperbili, on 

phototherapy. Voiding and stooling well.  Tolerating feeds, no acute events 

overnight. 








- [place] Exam


Last weight documented: 2.15 kg


Chest Circumference: 29.0


Head Circumference: 29.0


Vital Signs: 


 Vital Signs











Temperature  37.3 C   17 09:00


 


Pulse Rate  165 H  17 09:00


 


Respiratory Rate  66   17 09:00


 


Blood Pressure  67/38   17 09:00


 


O2 Sat by Pulse Oximetry (%)  100   17 09:00











General Appearance: Yes: No Abnormalities, Full ROM, Spontaneous movements


Skin: Yes: No Abnormalities, Jaundice


Head: Yes: No Abnormalities


Eyes: Yes: No Abnormalities, Clear


Ears: Yes: No Abnormalities, Symmetrical


Nose: Yes: No Abnormalities


Mouth: Yes: No Abnormalities


Chest: Yes: No Abnormalities, Symmetrical


Cardiac: Yes: No Abnormalities, Murmur (1-2/6 systolic murmur  LSB), Other (S1 

and S2 normal)


Abdomen: Yes: No Abnormalities


Gastrointestinal: Yes: No Abnormalities


Genitalia: No Abnormalities


Genitalia, Male: Yes: Bilateral testes descended, Penis appears normal, 

Hydrocele, Other


Anus: Yes: No Abnormalities, Patent


Extremities: Yes: No Abnormalities, 10 Fingers, 10 Toes


Spine: Yes: No Abnormalities


Reflexes: [place]: Present, Sucking: Present


Neuro: Yes: No Abnormalities, Alert, Active


Cry: No Abnormalities, Strong


Intake and Output: 


 Intake + Output











 17





 23:59 11:59


 


Intake Total 140 140


 


Output Total 71 63


 


Balance 69 77


 


Intake:  


 


  Oral 105 100


 


  Expressed Breastmilk 35 40


 


Output:  


 


  Urine 71 63


 


Other:  


 


  Weight 2.15 kg 


 


  Weight Measurement Method Baby Scale 











Labs, Other Data: 


 Baby's Blood Type, Annamaria











Cord Blood Type  A POSITIVE   09/10/17  23:40    


 


RUDOLPH, Poly Interpret  Negative  (NEGATIVE)   09/10/17  23:40    














Problem List





- Problems


(1) Hyperbilirubinemia


Code(s): E80.6 - OTHER DISORDERS OF BILIRUBIN METABOLISM





(2) Prematurity of fetus


Code(s): P07.30 -  , UNSPECIFIED WEEKS OF GESTATION





(3) Twin birth, in hospital, delivered by  section


Code(s): Z38.31 - TWIN LIVEBORN INFANT, DELIVERED BY 








Assessment/Plan


8 day old  34wk by US and exam (37wk by dates), male infant born via 

primary  for twin gestation and variable decels in twin B. Admitted to 

NICU for prematurity and r/o sepsis ( labor). Mother: s/p betamethasone 

at 28wks - 1 course. Baby was on RA, no respiratory issues, under restarted 

yesterday 





Resp: Stable on room air; no ABDs reported; will continue to monitor. 


ID: s/p Amp+GentX 48 h-blood culture negative; will continue monitoring. 


Cardio: Systolic murmur-diminished compared to my previous exams; 

hemodinamically stable; continue monitoring- if persists, will consider Echo


Hem: Anemia-last Hct 29.4- will repeat CBC in AM;  bili this morning 9.9/0.4- 

will continue phototherapy- one bank only and repeat  Bili in am . G6PD sent - f/u results.  TST, Free T4 -WNL. 


Metab/ Alim: Feeds at 35 ml Q3h EBM/ Enf 22; tolerating enteral feeds, nippling 

Q other feed. Encourage po feeds. 





Discussed plan with nurses; family updated.

## 2017-01-01 NOTE — PN
Neonatology, Progress Note





- History of Present Illness


 History: 


Ex 34 weeker, twin A, DOL 13, with worsening anemia and hyperbilirubinemia, 

otherwise on room air, no events overnight, tolerating po feeds. 





- [place] Exam


Last weight documented: 2.235 kg


Chest Circumference: 29.0


Head Circumference: 29.0


Vital Signs: 


 Vital Signs











Temperature  37.3 C   17 07:30


 


Pulse Rate  156   17 07:30


 


Respiratory Rate  47   17 07:30


 


Blood Pressure  65/31   17 07:30


 


O2 Sat by Pulse Oximetry (%)  100   17 07:30











General Appearance: Yes: No Abnormalities, Full ROM, Spontaneous movements


Skin: Yes: No Abnormalities, Jaundice


Head: Yes: No Abnormalities


Eyes: Yes: No Abnormalities, Clear


Ears: Yes: No Abnormalities, Symmetrical


Nose: Yes: No Abnormalities


Mouth: Yes: No Abnormalities


Chest: Yes: No Abnormalities, Symmetrical


Cardiac: Yes: No Abnormalities, Murmur (1-2/6 systolic murmur  LSB), Other (S1 

and S2 normal)


Abdomen: Yes: No Abnormalities


Gastrointestinal: Yes: No Abnormalities


Genitalia: No Abnormalities


Genitalia, Male: Yes: Bilateral testes descended, Penis appears normal


Anus: Yes: No Abnormalities, Patent


Extremities: Yes: No Abnormalities, 10 Fingers, 10 Toes


Spine: Yes: No Abnormalities


Reflexes: [place]: Present, Sucking: Present


Neuro: Yes: No Abnormalities, Alert, Active


Cry: No Abnormalities, Strong


Intake and Output: 


 Intake + Output











 17





 23:59 11:59


 


Intake Total 145 130


 


Output Total 52 115


 


Balance 93 15


 


Intake:  


 


  Oral 70 85


 


  Expressed Breastmilk 75 45


 


Output:  


 


  Urine 52 115


 


Other:  


 


  Bowel Movement No 


 


  Weight 2.235 kg 


 


  Weight Measurement Method Baby Scale 











Labs, Other Data: 


 Baby's Blood Type, Annamaria











Cord Blood Type  A POSITIVE   09/10/17  23:40    


 


RUDOLPH, Poly Interpret  Negative  (NEGATIVE)   09/10/17  23:40    














Problem List





- Problems


(1) Hyperbilirubinemia


Code(s): E80.6 - OTHER DISORDERS OF BILIRUBIN METABOLISM





(2) Prematurity of fetus


Code(s): P07.30 -  , UNSPECIFIED WEEKS OF GESTATION





(3) Twin birth, in hospital, delivered by  section


Code(s): Z38.31 - TWIN LIVEBORN INFANT, DELIVERED BY 








Assessment/Plan


 13 day old  34wk by US and exam (37wk by dates), male infant born via 

primary  for twin gestation and variable decels in twin B. Admitted to 

NICU for prematurity and r/o sepsis ( labor). Mother: s/p betamethasone 

at 28wks - 1 course. Baby was on RA, no respiratory issues; with anemia and 

hyperbilirubinemia; was off phototherapy for the last 2 days. Tolerating feeds 

po, gaining weight; voiding and stooling





Resp: Stable on room air; no ABDs reported; will continue to monitor. 


ID: s/p Amp+GentX 48 h-blood culture negative; will continue monitoring. 


Cardio: Systolic murmur-diminished compared to my previous exams; 

hemodinamically stable; continue monitoring


Hem: Anemia, worsening this morning(H&H: 6.5/18.5), repeated H&H 6.6/19.5, 

Retics 8.2; bili this morning 12.3/0.5


Spoke with Hematology at [facility] about the worsening anemia; recommendation made to 

transfuse baby after anemia work-up labs sent. Considering the severity of 

anemia, the need for transfusion and further work-up, decision made to transfer 

baby to [facility] for further management.  


Metab/ Alim: Feeds at 35-40 ml Q3h EBM/ Enf 22; tolerating enteral feeds. 


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





Discussed plan with nurses. 


Discussed with parents and explained to them the need for transfer; questions 

answered; they expressed understanding.

## 2017-01-01 NOTE — PN
Neonatology, Progress Note





- History of Present Illness


 History: 


Bili improving, phototherapy discontinued this am. Tolerating PO feeds. Voiding 

and stooling. 





-  Exam


Last weight documented: 2.165 kg


Chest Circumference: 29.0


Head Circumference: 29.0


Vital Signs: 


 Vital Signs











Temperature  37.0 C   17 09:00


 


Pulse Rate  152   17 09:00


 


Respiratory Rate  31   17 09:00


 


Blood Pressure  66/33   17 21:00


 


O2 Sat by Pulse Oximetry (%)  100   17 09:00











General Appearance: Yes: No Abnormalities, Full ROM, Spontaneous movements


Skin: Yes: No Abnormalities, Jaundice


Head: Yes: No Abnormalities


Eyes: Yes: No Abnormalities, Clear


Ears: Yes: No Abnormalities, Symmetrical


Nose: Yes: No Abnormalities


Mouth: Yes: No Abnormalities


Chest: Yes: No Abnormalities, Symmetrical


Lungs/Respiratory: Yes: No Abnormalities, Clear, Bilateral good air entry


Cardiac: Yes: No Abnormalities, Murmur (1-2/6 systolic murmur  LSB), Other (S1 

and S2 normal)


Abdomen: Yes: No Abnormalities


Gastrointestinal: Yes: No Abnormalities


Genitalia: No Abnormalities


Genitalia, Male: Yes: Bilateral testes descended, Penis appears normal, 

Hydrocele, Other


Anus: Yes: No Abnormalities, Patent


Extremities: Yes: No Abnormalities, 10 Fingers, 10 Toes


Spine: Yes: No Abnormalities


Reflexes: Lopez: Present, Sucking: Present


Neuro: Yes: No Abnormalities, Alert, Active


Cry: No Abnormalities, Strong


Intake and Output: 


 Intake + Output











 17





 23:59 11:59


 


Intake Total 185 180


 


Output Total 110 93


 


Balance 75 87


 


Intake:  


 


  Oral 95 55


 


  Expressed Breastmilk 90 125


 


Output:  


 


  Urine 110 93


 


Other:  


 


  Weight 2.17 kg 2.165 kg


 


  Weight Measurement Method  Baby Scale











Labs, Other Data: 


 Baby's Blood Type, Annamaria











Cord Blood Type  A POSITIVE   09/10/17  23:40    


 


RUDOLPH, Poly Interpret  Negative  (NEGATIVE)   09/10/17  23:40    








 Laboratory Tests











  17





  07:45


 


Total Bilirubin  9.0


 


Direct Bilirubin  0.4 H














Problem List





- Problems


(1) Prematurity of fetus


Code(s): P07.30 -  , UNSPECIFIED WEEKS OF GESTATION





(2) Twin birth, in hospital, delivered by  section


Code(s): Z38.31 - TWIN LIVEBORN INFANT, DELIVERED BY 








Assessment/Plan


9 day old  34wk by US and exam (37wk by dates), male infant born via 

primary  for twin gestation and variable decels in twin B. Admitted to 

NICU for prematurity and r/o sepsis ( labor). Mother: s/p betamethasone 

at 28wks - 1 course. Baby was on RA, no respiratory issues, under phototherapy 

restarted  





Resp: Stable on room air; no ABDs reported; will continue to monitor. 


ID: s/p Amp+GentX 48 h-blood culture negative; will continue monitoring. 


Cardio: Systolic murmur; hemodynamically stable; continue monitoring- if 

persists, will consider Echo, EKG  normal for age


Hem: Anemia-last Hct 32- this am. Not symptomatic, retic decreased;  total bili 

this morning 9- will discontinue phototherapy- and repeat  Bili in am . G6PD 

sent 17-test not performed due to clotted sample- resulted . consider 

resending G6PD- need to coordiate withoutside lab for timing of collection and 

sample to be run.  TST, Free T4 -WNL. NBS (lab ID [id number]) not ready yet as 

per [facility]


Metab/ Alim: Feeds at 35 ml Q3h EBM/ Enf 22; tolerating enteral feeds, nippling 

Q other feed. Encourage po feeds. CMP acceptable 





Discussed plan with nurses; will update family.

## 2017-01-01 NOTE — EKG
Test Reason : 

Blood Pressure : ***/*** mmHG

Vent. Rate : 156 BPM     Atrial Rate : 156 BPM

   P-R Int : 104 ms          QRS Dur : 048 ms

    QT Int : 256 ms       P-R-T Axes : 065 097 053 degrees

   QTc Int : 412 ms

 

** ** ** ** * PEDIATRIC ECG ANALYSIS * ** ** ** **

NORMAL SINUS RHYTHM

NORMAL EKG FOR . 

NO PREVIOUS ECGS AVAILABLE

Confirmed by KT KEY, VIOLETTA ([id number]),  LITTLE SOTELO ([id number]) on

2017 7:57:55 AM

 

Referred By: JAMMIE ROBLES DR           Confirmed By:VIOLETTA KEY M.D.